# Patient Record
Sex: FEMALE | Race: AMERICAN INDIAN OR ALASKA NATIVE
[De-identification: names, ages, dates, MRNs, and addresses within clinical notes are randomized per-mention and may not be internally consistent; named-entity substitution may affect disease eponyms.]

---

## 2017-04-01 ENCOUNTER — HOSPITAL ENCOUNTER (OUTPATIENT)
Dept: HOSPITAL 14 - H.ER | Age: 72
Setting detail: OBSERVATION
LOS: 1 days | Discharge: HOME | End: 2017-04-02
Attending: FAMILY MEDICINE | Admitting: FAMILY MEDICINE
Payer: COMMERCIAL

## 2017-04-01 DIAGNOSIS — C25.9: ICD-10-CM

## 2017-04-01 DIAGNOSIS — J45.909: ICD-10-CM

## 2017-04-01 DIAGNOSIS — I10: ICD-10-CM

## 2017-04-01 DIAGNOSIS — D64.9: Primary | ICD-10-CM

## 2017-04-01 NOTE — ED PDOC
HPI: Fever


Fever Onset Was: 17 (subjective, patient did not have a working 

thermometer)


What Antipyretic Given Prior To Arrival: Acetaminophen (tylenol, PTA)


Did The Patient Have A Seizure Today: No


Symptoms Associated With Fever: Cough (productive of yellow sputum), Other (

lightheaded, general weakness).  denies: Diarrhea, Rash


Additional Comments: 22:30.  72 year old female patient with a pertinent 

medical history of HTN, asthma, anemia, pancreatic cancer, and is hard of 

hearing presents to the ED with complaints of a fever (subjective because she 

doesn't have a working thermometer at home) and a cough productive of yellow 

sputum which started 1x day ago. She is currently undergoing chemotherapy and 

her last treatment was 1x day ago. She reports that she needs a transfusion for 

severe anemia which she scheduled for next week. She took tylenol prior to 

arrival to the ED. She also complains of feeling lightheaded and weak, but 

denies having any complaints of hemoptysis, diarrhea, rash, sore throat, and 

runny nose.  PMD: Dwight Tejada.





Past Medical History


Reviewed: Historical Data, Nursing Documentation, Vital Signs


Vital Signs: 


 Last Vital Signs











Temp  98.2 F   17 22:24


 


Pulse  97 H  17 22:24


 


Resp  18   17 22:24


 


BP  142/56 L  17 22:24


 


Pulse Ox  99   17 23:34














- Medical History


PMH: Anemia, Asthma, HTN


Other PMH: chronic lower leg edema





- Surgical History


Other surgeries: whipple





- Family History


Family History: States: No Known Family Hx





- Social History


Current smoker - smoking cessation education provided: No


Alcohol: None


Drugs: Denies





- Home Medications


Home Medications: 


 Ambulatory Orders











 Medication  Instructions  Recorded


 


Meclizine Hydrochloride [Meclizine 25 mg PO TID PRN 05/31/15





HCl]  


 


Acetaminophen [Tylenol Extra 1,000 mg PO Q8H PRN 11/19/15





Strength]  


 


Albuterol Sulfate [Ventolin Hfa] 2 puff IH TID PRN 11/19/15


 


Ferrous Sulfate 325 mg PO DAILY 11/19/15


 


Lipase/Protease/Amylase [Creon Dr 1 cap PO AC 11/19/15





36,000 Units Capsule]  


 


Lorazepam [Ativan] 0.5 mg PO TID PRN 11/19/15


 


Multivit, Iron, Min #5, FA 1 tab PO DAILY 11/19/15





[Strovite Forte Caplet]  


 


Pantoprazole Sodium [Protonix] 40 mg PO DAILY 11/19/15


 


Potassium Gluconate 1 tab PO DAILY 11/19/15


 


amLODIPine [Norvasc] 10 mg PO DAILY 11/19/15


 


Furosemide [Lasix] 10 mg PO DAILY 17


 


Nitrofurantoin Macrocrystals 100 mg PO BID 17





[Macrobid]  














- Allergies


Allergies/Adverse Reactions: 


 Allergies











Allergy/AdvReac Type Severity Reaction Status Date / Time


 


No Known Allergies Allergy   Verified 17 22:23














Review of Systems


ROS Statement: Except As Marked, All Systems Reviewed And Found Negative


Constitutional: Positive for: Fever (subjective), Weakness


ENT: Negative for: Nose Discharge, Throat Pain


Cardiovascular: Positive for: Light Headedness


Respiratory: Positive for: Cough, Sputum (yellow).  Negative for: Hemoptysis


Gastrointestinal: Negative for: Diarrhea


Musculoskeletal: Negative for: Leg Pain (has chronic leg edema, denies having 

any unusual pain or other complaints)





Physical Exam





- Reviewed


Nursing Documentation Reviewed: Yes


Vital Signs Reviewed: Yes





- Physical Exam


Appears: Positive for: Non-toxic, No Acute Distress.  Negative for: Well (tired)


Head Exam: Positive for: ATRAUMATIC, NORMOCEPHALIC


Skin: Positive for: Warm, Dry, Pallor


Eye Exam: Positive for: EOMI, PERRL.  Negative for: Normal appearance (pale 

conjunctiva)


ENT: Positive for: Pharynx Is (clear, no erythema), Other (tacky mucous 

membranes)


Neck: Positive for: Normal, Painless ROM, Supple


Cardiovascular/Chest: Positive for: Regular Rate, Rhythm, Chest Non Tender.  

Negative for: Murmur


Respiratory: Positive for: Normal Breath Sounds.  Negative for: Wheezing


Gastrointestinal/Abdominal: Positive for: Normal Exam, Soft.  Negative for: 

Tenderness


Back: Positive for: Normal Inspection.  Negative for: Decreased ROM


Extremity: Positive for: Normal ROM, Other (trace bilateral lower leg edema).  

Negative for: Deformity


Lymphatic: Negative for: Adenopathy


Neurologic/Psych: Positive for: Alert, Oriented (3x).  Negative for: Motor/

Sensory Deficits





- Laboratory Results


Result Diagrams: 


 17 00:08





 17 00:08





- ECG


O2 Sat by Pulse Oximetry: 99 (RA)


Pulse Ox Interpretation: Normal





Medical Decision Making


Medical Decision Makin:30


Initial impression: 72 year old female with a fever and immunocompromised.  

Differential diagnoses include but are not limited to bacteremia, sepsis, 

pneumonia, influenza, and viral syndrome. 


Initial plan:


* VBG shock panel


* CMP


* lactic acid, plasma


* magnesium


* phosphorous


* udip


* CBC


* PTT coag


* prothrombin time


* reticulocyte count


* XRay chest 2 views


* blood culture


* urine culture


* influenza AB


* urinalysis


* reevaluation


 


--------------------------------------------------------------------------------

----------------- 


Scribe Attestation:


Documented by Tiffanie Gallegos, acting as a scribe for Virginie Shultz MD.


 


Provider Scribe Attestation:


All medical record entries made by the Scribe were at my direction and 

personally dictated by me. I have reviewed the chart and agree that the record 

accurately reflects my personal performance of the history, physical exam, 

medical decision making, and the department course for this patient. I have 

also personally directed, reviewed, and agree with the discharge instructions 

and disposition.


 





Disposition





- Clinical Impression


Clinical Impression: 


 Fever





- Disposition


Disposition: Transfer of Care


Disposition Time: 00:00


Condition: STABLE


Patient Signed Over To: Star Whitman


Handoff Comments: Pending ER workup, reassessment and final disposition

## 2017-04-02 VITALS
SYSTOLIC BLOOD PRESSURE: 117 MMHG | RESPIRATION RATE: 19 BRPM | DIASTOLIC BLOOD PRESSURE: 70 MMHG | HEART RATE: 78 BPM | TEMPERATURE: 98.8 F

## 2017-04-02 VITALS — OXYGEN SATURATION: 99 %

## 2017-04-02 LAB
ALBUMIN/GLOB SERPL: 0.9 {RATIO} (ref 1–2.1)
ALP SERPL-CCNC: 155 U/L (ref 38–126)
ALT SERPL-CCNC: 17 U/L (ref 9–52)
APTT BLD: 29.6 SECONDS (ref 23.3–32.5)
AST SERPL-CCNC: 42 U/L (ref 14–36)
BACTERIA #/AREA URNS HPF: (no result) /[HPF]
BASOPHILS # BLD AUTO: 0 K/UL (ref 0–0.2)
BASOPHILS NFR BLD: 0.3 % (ref 0–2)
BILIRUB SERPL-MCNC: 0.5 MG/DL (ref 0.2–1.3)
BILIRUB UR-MCNC: NEGATIVE MG/DL
BUN SERPL-MCNC: 23 MG/DL (ref 7–17)
CALCIUM SERPL-MCNC: 8.9 MG/DL (ref 8.4–10.2)
CHLORIDE SERPL-SCNC: 108 MMOL/L (ref 98–107)
CO2 SERPL-SCNC: 18 MMOL/L (ref 22–30)
COLOR UR: YELLOW
EOSINOPHIL # BLD AUTO: 0 K/UL (ref 0–0.7)
EOSINOPHIL NFR BLD: 0.2 % (ref 0–4)
ERYTHROCYTE [DISTWIDTH] IN BLOOD BY AUTOMATED COUNT: 17.1 % (ref 11.5–14.5)
ERYTHROCYTE [DISTWIDTH] IN BLOOD BY AUTOMATED COUNT: 19 % (ref 11.5–14.5)
GLOBULIN SER-MCNC: 4.1 GM/DL (ref 2.2–3.9)
GLUCOSE SERPL-MCNC: 161 MG/DL (ref 65–105)
GLUCOSE UR STRIP-MCNC: (no result) MG/DL
HCT VFR BLD CALC: 20.4 % (ref 34–47)
HCT VFR BLD CALC: 29.7 % (ref 34–47)
KETONES UR STRIP-MCNC: NEGATIVE MG/DL
LEUKOCYTE ESTERASE UR-ACNC: (no result) LEU/UL
LYMPHOCYTES # BLD AUTO: 0.7 K/UL (ref 1–4.3)
LYMPHOCYTES NFR BLD AUTO: 10.1 % (ref 20–40)
MAGNESIUM SERPL-MCNC: 2.3 MG/DL (ref 1.6–2.3)
MCH RBC QN AUTO: 28.7 PG (ref 27–31)
MCH RBC QN AUTO: 29.5 PG (ref 27–31)
MCHC RBC AUTO-ENTMCNC: 32 G/DL (ref 33–37)
MCHC RBC AUTO-ENTMCNC: 33.2 G/DL (ref 33–37)
MCV RBC AUTO: 88.8 FL (ref 81–99)
MCV RBC AUTO: 89.8 FL (ref 81–99)
MONOCYTES # BLD: 0.7 K/UL (ref 0–0.8)
MONOCYTES NFR BLD: 9.7 % (ref 0–10)
NEUTROPHILS # BLD: 5.5 K/UL (ref 1.8–7)
NEUTROPHILS NFR BLD AUTO: 79.7 % (ref 50–75)
NRBC BLD AUTO-RTO: 0 % (ref 0–0)
PH UR STRIP: 6 [PH] (ref 5–8)
PHOSPHATE SERPL-MCNC: 3.9 MG/DL (ref 2.5–4.5)
PLATELET # BLD: 285 K/UL (ref 130–400)
PLATELET # BLD: 287 K/UL (ref 130–400)
PMV BLD AUTO: 7.7 FL (ref 7.2–11.7)
POTASSIUM SERPL-SCNC: 4.2 MMOL/L (ref 3.6–5)
PROT SERPL-MCNC: 7.9 G/DL (ref 6.3–8.2)
PROT UR STRIP-MCNC: 30 MG/DL
RBC # UR STRIP: (no result) /UL
RBC #/AREA URNS HPF: 4 /HPF (ref 0–3)
SODIUM SERPL-SCNC: 141 MMOL/L (ref 132–148)
SP GR UR STRIP: 1.01 (ref 1–1.03)
UROBILINOGEN UR-MCNC: (no result) MG/DL (ref 0.2–1)
WBC # BLD AUTO: 6.8 K/UL (ref 4.8–10.8)
WBC # BLD AUTO: 7.4 K/UL (ref 4.8–10.8)
WBC #/AREA URNS HPF: 28 /HPF (ref 0–5)

## 2017-04-02 NOTE — CP.PCM.HP
History of Present Illness





- History of Present Illness


History of Present Illness: 


This is a72 y/o female admitted for very low Hgb.


Currently undergoing chemotherapy for pancreatic cancer in [place]. 


Claims  that for the past few days she had episodes of URI and has been 

coughing.





Past Patient History





- Past Medical History & Family History


Past Medical History?: No





- Past Social History


Smoking Status: Former Smoker





- CARDIAC


Hx Cardiac Disorders: Yes (htn)





- PULMONARY


Hx Asthma: Yes





- NEUROLOGICAL


Hx Vertigo: Yes





- ENDOCRINE/METABOLIC


Hx Endocrine Disorders: Yes (pancreatic ca)





- HEMATOLOGICAL/ONCOLOGICAL


Hx Blood Disorders: Yes (anemia)





- MUSCULOSKELETAL/RHEUMATOLOGICAL


Hx Falls: No





- GASTROINTESTINAL


Other/Comment: PANCREATIC CANCER





- GENITOURINARY/GYNECOLOGICAL


Hx Genitourinary Disorders: Yes (UTI)





- PSYCHIATRIC


Hx Substance Use: No





- SURGICAL HISTORY


Hx Surgeries: No





- ANESTHESIA


Hx Anesthesia: Yes


Hx Anesthesia Reactions: No





Meds


Allergies/Adverse Reactions: 


 Allergies











Allergy/AdvReac Type Severity Reaction Status Date / Time


 


No Known Allergies Allergy   Verified 04/01/17 22:23














Results





- Vital Signs


Recent Vital Signs: 





 Last Vital Signs











Temp  98.3 F   04/02/17 07:49


 


Pulse  83   04/02/17 09:07


 


Resp  20   04/02/17 07:49


 


BP  160/66 H  04/02/17 10:49


 


Pulse Ox  98   04/02/17 07:49














- Labs


Result Diagrams: 


 04/01/17 00:08





 04/01/17 00:08


Labs: 





 Laboratory Results - last 24 hr











  04/02/17 04/02/17





  02:50 05:09


 


POC Glucose (mg/dL)   159 H


 


Blood Type  B POSITIVE 


 


Antibody Screen  Negative 


 


Crossmatch IS Only  See Detail 


 


BBK History Checked  Patient has bt

## 2017-04-02 NOTE — RAD
HISTORY:

fever cough  



COMPARISON:

11/19/2015.



TECHNIQUE:

Chest PA and lateral



FINDINGS:



LUNGS:

Hazy opacity in the lung bases.



PLEURA:

Possible small bilateral pleural effusions.



CARDIOVASCULAR:

Enlarged cardiomediastinal silhouette as before.



OSSEOUS STRUCTURES:

The osseous structures demonstrate degenerative changes. 



VISUALIZED UPPER ABDOMEN:

Upper abdomen is suboptimally evaluated.



OTHER FINDINGS:

Right-sided medication port with the distal tip of the catheter 

overlying the projection of the SVC/right atrial junction.



IMPRESSION:

Possible small bilateral pleural effusions with associated 

compressive atelectasis and/or pneumonia.

## 2017-04-02 NOTE — CARD
--------------- APPROVED REPORT --------------





EKG Measurement

Heart [id number]

MN [id number]

[id number]

[id number]

[id number]



<Conclusion>

Normal sinus rhythm

Normal ECG

## 2017-04-14 NOTE — ED PDOC
- Laboratory Results


Result Diagrams: 


 17 18:00





 17 00:08





- ECG


O2 Sat by Pulse Oximetry: 99 (RA)


Pulse Ox Interpretation: Normal





Medical Decision Making


Medical Decision Makin:00


Patient endorsed over to me by Virginie Shultz MD, pending results of ED 

workup, reevaluation and final disposition.





02:42


Labs reviewed, CBC indicates worsening anemia below patient's known baseline. 

Discussed with patient possible admission for transfusion, though patient 

reports originally being scheduled for a blood transfusion next week, decision 

not to wait has been made given ED lab results. Consent for transfusion has 

been obtained.





Case discussed with Dr. Tejada (patient's PMD), patient will be hospitalized 

under his service.





Clinical Impression: anemia





--------------------------------------------------------------------------------

----------------- 


Scribe Attestation:


Documented by Angelia Daniel, acting as a scribe for Star Whitman MD.





Provider Scribe Attestation:


All medical record entries made by the Scribe were at my direction and 

personally dictated by me. I have reviewed the chart and agree that the record 

accurately reflects my personal performance of the history, physical exam, 

medical decision making, and the department course for this patient. I have 

also personally directed, reviewed, and agree with the discharge instructions 

and disposition.





Disposition





- Clinical Impression


Clinical Impression: 


 Anemia





- POA


Present On Arrival: None





- Disposition


Disposition: Hospitalized as Observation Patient


Disposition Time: 02:42


Condition: STABLE

## 2017-05-08 ENCOUNTER — HOSPITAL ENCOUNTER (EMERGENCY)
Dept: HOSPITAL 14 - H.ER | Age: 72
Discharge: HOME | End: 2017-05-08
Payer: COMMERCIAL

## 2017-05-08 VITALS
RESPIRATION RATE: 16 BRPM | TEMPERATURE: 98.2 F | DIASTOLIC BLOOD PRESSURE: 66 MMHG | SYSTOLIC BLOOD PRESSURE: 141 MMHG | HEART RATE: 94 BPM | OXYGEN SATURATION: 95 %

## 2017-05-08 DIAGNOSIS — Z45.2: Primary | ICD-10-CM

## 2017-05-08 NOTE — ED PDOC
HPI: General Adult


Time Seen by Provider: 17 23:06


Chief Complaint (Nursing): Medical Clearance


Chief Complaint (Provider): needs needle removed from portacath


History Per: Patient


History/Exam Limitations: no limitations


Onset/Duration Of Symptoms: Hrs


Have you had recent travel within the past 21 days to any of the following 

countries: [place], [place], [place] or [place]?: No


Current Symptoms Are (Timing): Still Present


Additional Complaint(s): 


71yo female with PMHx including HTN, asthma, anemia, pancreatic cancer s/p 

whipple procedure and currently on chemotherapy (last treatment was in 

[place] this morning). Patient reports after chemo treatment today they 

forgot to take needle out from right portacath. She called the treatment center 

when she got back and they told her to go to the nearest hospital and have 

needle removed. Denies fever, chills, or any other medical complaints. 








Past Medical History


Reviewed: Historical Data, Nursing Documentation, Vital Signs


Vital Signs: 


 Last Vital Signs











Temp  98.2 F   17 22:40


 


Pulse  94 H  17 22:40


 


Resp  16   17 22:40


 


BP  141/66   17 22:40


 


Pulse Ox  95   17 23:41














- Medical History


PMH: Anemia, Asthma, HTN


Other PMH: pancreatic cancer 





- Surgical History


Other surgeries: whipple procedure





- Family History


Family History: States: No Known Family Hx





- Social History


Current smoker - smoking cessation education provided: No


Alcohol: None


Drugs: Denies





- Home Medications


Home Medications: 


 Ambulatory Orders











 Medication  Instructions  Recorded


 


Meclizine Hydrochloride [Meclizine 25 mg PO TID PRN 05/31/15





HCl]  


 


Acetaminophen [Tylenol Extra 1,000 mg PO Q8H PRN 11/19/15





Strength]  


 


Albuterol Sulfate [Ventolin Hfa] 2 puff IH TID PRN 11/19/15


 


Ferrous Sulfate 325 mg PO DAILY 11/19/15


 


Lipase/Protease/Amylase [Creon Dr 1 cap PO AC 11/19/15





36,000 Units Capsule]  


 


Lorazepam [Ativan] 0.5 mg PO TID PRN 11/19/15


 


Multivit,Iron,Min 5/Folic Acid 1 tab PO DAILY 11/19/15





[Strovite Forte Caplet]  


 


Pantoprazole Sodium [Protonix] 40 mg PO DAILY 11/19/15


 


Potassium Gluconate 1 tab PO DAILY 11/19/15


 


amLODIPine [Norvasc] 10 mg PO DAILY 11/19/15


 


Furosemide [Lasix] 10 mg PO DAILY 17


 


Nitrofurantoin Macrocrystals 100 mg PO BID 17





[Macrobid]  














- Allergies


Allergies/Adverse Reactions: 


 Allergies











Allergy/AdvReac Type Severity Reaction Status Date / Time


 


No Known Allergies Allergy   Verified 17 22:39














Review of Systems


ROS Statement: Except As Marked, All Systems Reviewed And Found Negative


Constitutional: Negative for: Fever, Chills





Physical Exam





- Reviewed


Nursing Documentation Reviewed: Yes


Vital Signs Reviewed: Yes





- Physical Exam


Appears: Positive for: Well, No Acute Distress


Head Exam: Positive for: ATRAUMATIC, NORMAL INSPECTION, NORMOCEPHALIC


Skin: Positive for: Normal Color, Warm, Dry


Eye Exam: Positive for: Normal appearance


Cardiovascular/Chest: Positive for: Regular Rate, Rhythm, Other (right 

portacath is clean, dry, and intact; no erythema, needle removed under sterile 

conditions).  Negative for: Murmur, Tachycardia


Respiratory: Positive for: Normal Breath Sounds.  Negative for: Wheezing, 

Respiratory Distress


Neurologic/Psych: Positive for: Alert, Oriented





- ECG


O2 Sat by Pulse Oximetry: 95


Pulse Ox Interpretation: Normal (RA)





Medical Decision Making


Medical Decision Makin:


Impression: presents for removal of needle from right portacath





Plan:


Needle removed under sterile conditions and patient stable for d/c. Advised to f

/u w/ her PCP and return to the ED with any worsening or concerning symptoms. 











-------------------------


Scribe Attestation:


Documented by QING Baird acting as a scribe for Tammi Gutierrez MD.   


Provider Scribe Attestation:


All medical record entries made by the Scribe were at my direction and 

personally dictated by me. I


have reviewed the chart and agree that the record accurately reflects my 

personal performance of the


history, physical exam, medical decision making, and the department course for 

this patient. I have


also personally directed, reviewed, and agree with the discharge instructions 

and disposition.   








Disposition





- Clinical Impression


Clinical Impression: 


 Procedure indicated








- Patient ED Disposition


Is Patient to be Admitted: No





- Disposition


Referrals: 


Dwight Tejada MD [Primary Care Provider] - 


Disposition: Routine/Home


Disposition Time: 23:30


Additional Instructions: 


follow up with your doctor


return to the ED with any worsening or concerning symptoms.

## 2018-08-17 ENCOUNTER — HOSPITAL ENCOUNTER (EMERGENCY)
Dept: HOSPITAL 14 - H.ER | Age: 73
Discharge: HOME | End: 2018-08-17
Payer: COMMERCIAL

## 2018-08-17 VITALS
OXYGEN SATURATION: 97 % | TEMPERATURE: 97.9 F | SYSTOLIC BLOOD PRESSURE: 121 MMHG | HEART RATE: 68 BPM | RESPIRATION RATE: 18 BRPM | DIASTOLIC BLOOD PRESSURE: 65 MMHG

## 2018-08-17 DIAGNOSIS — N39.0: Primary | ICD-10-CM

## 2018-08-17 LAB
BACTERIA #/AREA URNS HPF: (no result) /[HPF]
BILIRUB UR-MCNC: NEGATIVE MG/DL
COLOR UR: YELLOW
GLUCOSE UR STRIP-MCNC: (no result) MG/DL
LEUKOCYTE ESTERASE UR-ACNC: (no result) LEU/UL
PH UR STRIP: 6 [PH] (ref 5–8)
PROT UR STRIP-MCNC: NEGATIVE MG/DL
RBC # UR STRIP: (no result) /UL
SP GR UR STRIP: 1.01 (ref 1–1.03)
SQUAMOUS EPITHIAL: 10 /HPF (ref 0–5)
URINE AMORPHOUS SEDIMENT: (no result) /UL
URINE CLARITY: (no result)
UROBILINOGEN UR-MCNC: (no result) MG/DL (ref 0.2–1)

## 2018-08-17 NOTE — ED PDOC
HPI: Female  Pain


Time Seen by Provider: 08/17/18 11:58


Chief Complaint (Nursing): Female Genitourinary


Chief Complaint (Provider): Dysuria


History Per: Patient, Family (daughter)


History/Exam Limitations: no limitations


Onset/Duration Of Symptoms: Days (x3)


Current Symptoms Are (Timing): Still Present


Additional Complaint(s): 





Eliz Aguiar is a 73 year old female, with a past medical history of 

pancreatic CA with x2 tumor removals in lower abdomen and HTN, who presents to 

the emergency department accompanied by daughter because she wants to be 

checked for infection. Patient is currently complaining of dysuria ongoing for 

x3 days. Patient states x2 days ago she had a stent placed in ureter and gets 

it done every 3-6 months in [place]. Patient does that to keep kidneys open, without 

it they don't function and this has been ongoing for years. Patient states 

every time she gets it changed she is prone to urinary infections afterwards, 

as a result she gets her urine checked in [place] but this time they forgot so she's 

here to check for infection. She reports whenever she does get an infection she 

is prescribed Cipro. Otherwise she denies any fever or therapy for cancer for 

over a year. She denies any back pain, nausea, vomit, diarrhea, fever, chills, 

cough, congestion, chest pain, shortness of breath, abdominal pain, numbness or 

tingling, weakness, headache, dizziness and otherwise urinating well. Patient 

is here for urine check in case she needs antibiotics and goes to [place] for all 

her care. Daughter at beside who understands patient's history and plan. No 

further medical complaints.





PMD: in [place]. 





Past Medical History


Reviewed: Historical Data, Nursing Documentation, Vital Signs


Vital Signs: 





 Last Vital Signs











Temp  98.6 F   08/17/18 11:56


 


Pulse  89   08/17/18 11:56


 


Resp  20   08/17/18 11:56


 


BP  130/74   08/17/18 11:56


 


Pulse Ox  96   08/17/18 11:56














- Medical History


PMH: Anemia, Asthma, HTN


Other PMH: pancreatic cancer





- Surgical History


Other surgeries: x2 tumor removal in lower abdomen





- Family History


Family History: States: Unknown Family Hx





- Home Medications


Home Medications: 


 Ambulatory Orders











 Medication  Instructions  Recorded


 


Meclizine Hydrochloride [Meclizine 25 mg PO TID PRN 05/31/15





HCl]  


 


Acetaminophen [Tylenol Extra 1,000 mg PO Q8H PRN 11/19/15





Strength]  


 


Albuterol Sulfate [Ventolin Hfa] 2 puff IH TID PRN 11/19/15


 


Ferrous Sulfate 325 mg PO DAILY 11/19/15


 


Lipase/Protease/Amylase [Creon Dr 1 cap PO AC 11/19/15





36,000 Units Capsule]  


 


Lorazepam [Ativan] 0.5 mg PO TID PRN 11/19/15


 


Multivit,Iron,Min 5/Folic Acid 1 tab PO DAILY 11/19/15





[Strovite Forte Caplet]  


 


Pantoprazole Sodium [Protonix] 40 mg PO DAILY 11/19/15


 


Potassium Gluconate 1 tab PO DAILY 11/19/15


 


amLODIPine [Norvasc] 10 mg PO DAILY 11/19/15


 


Furosemide [Lasix] 10 mg PO DAILY 04/02/17


 


Nitrofurantoin Macrocrystals 100 mg PO BID 04/02/17





[Macrobid]  


 


Ciprofloxacin HCl [Cipro] 500 mg PO BID 7 Days  tab 08/17/18














- Allergies


Allergies/Adverse Reactions: 


 Allergies











Allergy/AdvReac Type Severity Reaction Status Date / Time


 


No Known Allergies Allergy   Verified 05/08/17 22:39














Review of Systems


ROS Statement: Except As Marked, All Systems Reviewed And Found Negative


Constitutional: Negative for: Fever, Chills


ENT: Negative for: Nose Congestion


Cardiovascular: Negative for: Chest Pain


Respiratory: Negative for: Cough, Shortness of Breath


Gastrointestinal: Negative for: Nausea, Vomiting, Abdominal Pain, Diarrhea


Genitourinary Female: Positive for: Dysuria


Musculoskeletal: Negative for: Back Pain


Neurological: Negative for: Weakness, Numbness, Headache, Dizziness





Physical Exam





- Reviewed


Nursing Documentation Reviewed: Yes


Vital Signs Reviewed: Yes





- Physical Exam


Appears: Positive for: No Acute Distress


Head Exam: Positive for: ATRAUMATIC, NORMAL INSPECTION, NORMOCEPHALIC


Skin: Positive for: Normal Color, Warm, Dry


Eye Exam: Positive for: Normal appearance, EOMI, PERRL


ENT: Positive for: Normal ENT Inspection


Neck: Positive for: Painless ROM


Cardiovascular/Chest: Positive for: Regular Rate, Rhythm.  Negative for: Murmur


Respiratory: Positive for: Normal Breath Sounds.  Negative for: Respiratory 

Distress


Gastrointestinal/Abdominal: Positive for: Normal Exam, Soft.  Negative for: 

Tenderness, Guarding, Rebound


Back: Positive for: Normal Inspection.  Negative for: L CVA Tenderness, R CVA 

Tenderness


Extremity: Positive for: Normal ROM (upper and lower extremities).  Negative for

: Tenderness, Deformity, Swelling


Neurologic/Psych: Positive for: Alert, Oriented, Gait (steady).  Negative for: 

Motor/Sensory Deficits





- Laboratory Results


Urine dip results: Positive for: Leukocyte Esterase





- ECG


O2 Sat by Pulse Oximetry: 96 (RA)


Pulse Ox Interpretation: Normal





- Progress


ED Course And Treament: 





1237:  Stable.  AAOx3.  Pain controlled.  Positive uti per dip.  Will rx cipro.

  





Medical Decision Making


Medical Decision Making: 





Time: 11:58


Initial Impression: Dysuria





Initial Plan:





--Urine dip


--Urine culture


--Urinalysis 


--Reevaluation





-Family wants urine dip, UA, and culture done. If it comes back negative they 

will follow up with PMD to continue monitoring for urinary symptoms. Patient 

reliable and will follow up with PMD and cancer center. 











--------------------------------------------------------------------------------

-----





Scribe Attestation:


Documented by Phillip Bonilla, acting as a scribe for Kennedy Conner MD.





Provider Scribe Attestation:


All medical record entries made by the Scribe were at my direction and 

personally dictated by me. I have reviewed the chart and agree that the record 

accurately reflects my personal performance of the history, physical exam, 

medical decision making, and the department course for this patient. I have 

also personally directed, reviewed, and agree with the discharge instructions 

and disposition.





Disposition





- Clinical Impression


Clinical Impression: 


 Urinary tract infection








- Patient ED Disposition


Is Patient to be Admitted: No


Counseled Patient/Family Regarding: Studies Performed, Diagnosis, Need For 

Followup, Rx Given





- Disposition


Referrals: 


[facility] [Outside] - 08/20/18


Disposition: Routine/Home


Disposition Time: 12:37


Condition: STABLE


Additional Instructions: 


Return if not better in 3 days. 


Prescriptions: 


Ciprofloxacin HCl [Cipro] 500 mg PO BID 7 Days  tab


Instructions:  Urinary Tract Infection, Adult (DC)


Forms:  [employer] (English)

## 2019-02-19 ENCOUNTER — HOSPITAL ENCOUNTER (EMERGENCY)
Dept: HOSPITAL 14 - H.ER | Age: 74
Discharge: HOME | End: 2019-02-19
Payer: COMMERCIAL

## 2019-02-19 VITALS — TEMPERATURE: 98.6 F | RESPIRATION RATE: 18 BRPM | OXYGEN SATURATION: 99 %

## 2019-02-19 VITALS — SYSTOLIC BLOOD PRESSURE: 159 MMHG | DIASTOLIC BLOOD PRESSURE: 92 MMHG | HEART RATE: 81 BPM

## 2019-02-19 DIAGNOSIS — H91.90: ICD-10-CM

## 2019-02-19 DIAGNOSIS — H57.11: Primary | ICD-10-CM

## 2019-02-19 DIAGNOSIS — I10: ICD-10-CM

## 2019-02-19 RX ADMIN — ERYTHROMYCIN SCH APPLIC: 5 OINTMENT OPHTHALMIC at 12:05

## 2019-02-19 NOTE — ED PDOC
HPI: Eye Injury/Pain


Time Seen by Provider: 02/19/19 09:00


Chief Complaint (Nursing): Eye Problem


Chief Complaint (Provider): Right eye pain


History Per: Patient


History/Exam Limitations: no limitations


Onset/Duration Of Symptoms: Days (3x)


Current Symptoms Are (Timing): Still Present


Quality: "Pain"


Associated Symptoms: Swelling, Discharge From Eye.  denies: FB Sensation, 

Itching


Additional Complaint(s): 


73 year old female presents to the ED for an evaluation of pain and redness to 

the right eye onset 3 days ago. Patient reports of watery discharge with 

photosensitivity and no floaters. She has never had this pain before. Patient 

states she was treated for pancreatic cancer 2 years ago. Otherwise, she denies 

injury, foreign body sensation or foreign body into eye, itchiness, fever or 

cough.  Pt only opens eye transiently due to pain. 





PMD: Dr. Bhandari











Past Medical History


Reviewed: Historical Data, Nursing Documentation, Vital Signs


Vital Signs: 


                                Last Vital Signs











Temp  98.6 F   02/19/19 08:33


 


Pulse  87   02/19/19 08:33


 


Resp  18   02/19/19 08:33


 


BP  146/71   02/19/19 08:33


 


Pulse Ox  99   02/19/19 08:33














- Medical History


PMH: Anemia, Asthma, HTN


Other PMH: hearing impaired





- Surgical History


Surgical History: No Surg Hx





- Family History


Family History: States: Unknown Family Hx





- Social History


Current smoker - smoking cessation education provided: No


Alcohol: None


Drugs: Denies





- Home Medications


Home Medications: 


                                Ambulatory Orders











 Medication  Instructions  Recorded


 


RX: Meclizine Hydrochloride 25 mg PO TID PRN 05/31/15





[Meclizine HCl]  


 


RX: Acetaminophen [Tylenol Extra 1,000 mg PO Q8H PRN 11/19/15





Strength]  


 


RX: Albuterol Sulfate [Ventolin 2 puff IH TID PRN 11/19/15





Hfa]  


 


RX: Ferrous Sulfate 325 mg PO DAILY 11/19/15


 


RX: Lipase/Protease/Amylase [Creon 1 cap PO AC 11/19/15





 36,000 Units Capsule]  


 


RX: Lorazepam [Ativan] 0.5 mg PO TID PRN 11/19/15


 


RX: Multivit,Iron,Min 5/Folic Acid 1 tab PO DAILY 11/19/15





[Strovite Forte Caplet]  


 


RX: Pantoprazole Sodium [Protonix] 40 mg PO DAILY 11/19/15


 


RX: Potassium Gluconate 1 tab PO DAILY 11/19/15


 


RX: amLODIPine [Norvasc] 10 mg PO DAILY 11/19/15


 


Furosemide [Lasix] 10 mg PO DAILY 04/02/17


 


Nitrofurantoin Macrocrystals 100 mg PO BID 04/02/17





[Macrobid]  


 


Ciprofloxacin HCl [Cipro] 500 mg PO BID 7 Days  tab 08/17/18














- Allergies


Allergies/Adverse Reactions: 


                                    Allergies











Allergy/AdvReac Type Severity Reaction Status Date / Time


 


No Known Allergies Allergy   Verified 05/08/17 22:39














Review of Systems


ROS Statement: Except As Marked, All Systems Reviewed And Found Negative


Constitutional: Negative for: Fever


Eyes: Positive for: Pain, Redness (right eye)


Respiratory: Negative for: Cough


Neurological: Negative for: Weakness, Numbness, Incoordination, Change in Speech

, Confusion, Seizures, Altered Mental Status, Headache, Dizziness


Psych: Negative for: Suicidal ideation





Physical Exam





- Reviewed


Nursing Documentation Reviewed: Yes


Vital Signs Reviewed: Yes





- Physical Exam


Appears: Positive for: Non-toxic, No Acute Distress


Head Exam: Positive for: ATRAUMATIC, NORMAL INSPECTION, NORMOCEPHALIC


Skin: Positive for: Normal Color, Warm, Dry


Eye Exam: Negative for: Normal appearance (trouble opening right eye - 

conjunctiva with redness throughout sclera. no purulent discharge. ), 

Conjunctival injection


ENT: Positive for: Normal ENT Inspection


Neck: Positive for: Normal


Cardiovascular/Chest: Positive for: Regular Rate, Rhythm.  Negative for: Murmur


Respiratory: Positive for: Normal Breath Sounds.  Negative for: Decreased Breath

 Sounds, Respiratory Distress


Neurologic/Psych: Positive for: Alert, Oriented (x3).  Negative for: 

Motor/Sensory Deficits





- ECG


O2 Sat by Pulse Oximetry: 99 (RA)


Pulse Ox Interpretation: Normal





Medical Decision Making


Medical Decision Making: 


Time: 0937


Impression: redness in eye rule otu Iritis  


Plan: 


--Nursing communication  - (erythromycin and patch as per optho)


--Reevaluation 


 


Will check patients visual acuity and call ophthalmologist. 





Patient declined visual acuity on the right eye or writer desire to check 

pressure to the eye. states it hurts her. 





[id number]


Placed a called to the ophthalmologist, Dr. Villarreal who will call back 





1127:


Spoke to Dr. Villarreal who suggested to apply erythromycin ointment and cover the 

right eye with patch. Patient needs to follow up with Dr. Villarreal at his office 

tomorrow 10 am . 





Patient will be discharged home. Counseling was provided and all questions were 

answered regarding diagnosis and need for follow up with Dr. Villarreal tomorrow. 

information given for office. There is agreement to discharge plan. pt and son 

at bedside agreeablet o plan. Return if symptoms persist or worsen.


-----------------------------------

-------------------------------------------------------------- 





Scribe Attestation:


Documented by Giovanna Soler, acting as a scribe for Tammi Gutierrez MD.





Provider Scribe Attestation:


All medical record entries made by the Scribe were at my direction and 

personally dictated by me. I have reviewed the chart and agree that the record 

accurately reflects my personal performance of the history, physical exam, 

medical decision making, and the department course for this patient. I have also

 personally directed, reviewed, and agree with the discharge instructions and 

disposition.














Disposition





- Clinical Impression


Clinical Impression: 


 Eye strain, Iritis








- Patient ED Disposition


Is Patient to be Admitted: No


Counseled Patient/Family Regarding: Studies Performed, Diagnosis, Need For 

Followup





- Disposition


Referrals: 


Valerio Villarreal MD [Staff Provider] - 


Disposition: Routine/Home


Disposition Time: 11:30


Condition: IMPROVED


Additional Instructions: 


follow up with DR VILLARREAL THE EYE DOCTOR TOMORROW 10 am


use antibiotic eye ointment


Return to the ED with any worsening or concerning symptoms 


Instructions:  Uveitis


Forms:  [employer] (English)